# Patient Record
Sex: FEMALE | Race: BLACK OR AFRICAN AMERICAN | URBAN - METROPOLITAN AREA
[De-identification: names, ages, dates, MRNs, and addresses within clinical notes are randomized per-mention and may not be internally consistent; named-entity substitution may affect disease eponyms.]

---

## 2020-11-16 ENCOUNTER — HISTORICAL (OUTPATIENT)
Dept: ADMINISTRATIVE | Facility: HOSPITAL | Age: 20
End: 2020-11-16

## 2022-04-09 ENCOUNTER — HISTORICAL (OUTPATIENT)
Dept: ADMINISTRATIVE | Facility: HOSPITAL | Age: 22
End: 2022-04-09

## 2022-04-25 VITALS
DIASTOLIC BLOOD PRESSURE: 69 MMHG | BODY MASS INDEX: 19.63 KG/M2 | WEIGHT: 122.13 LBS | SYSTOLIC BLOOD PRESSURE: 106 MMHG | HEIGHT: 66 IN

## 2022-05-02 NOTE — HISTORICAL [facility]
This is a historical note converted from Claire. Formatting and pictures may have been removed.  Please reference Claire for original formatting and attached multimedia. Chief Complaint  Referred for right foot fx  History of Present Illness  19-year-old female who?tripped and fell 2 weeks ago?sustaining an injury to her right ankle. ?She was seen in outside facility and noted to have a possible ankle fracture?placed in a splint and told to be nonweightbearing on crutches. ?Since then she has had?improvement in pain but?again she has been in a splint and compliant with nonweightbearing.  She reports the pain is localized to the ankle.? No pain in the foot or the proximal tibia.  She reports she does smoke. ?No drinking.  History of borderline hypothyroidism.  Review of Systems  No shortness of breath or chest pain  Physical Exam  Vitals & Measurements  HR:?96(Peripheral)? BP:?102/65?  HT:?168.00?cm? WT:?52.600?kg? BMI:?18.64? LMP:?11/14/2020 00:00 CST?  General NAD  Cardiology regular rate and rhythm per radial pulse  Right lower extremity  No wounds or abrasions  Tender to palpation at the ATFL?and CFL as well as at the posterior aspect of the fibula around?the peroneal tendons  Pain with?passive inversion and?active eversion  No pain with?dorsiflexion or compression of the syndesmosis  EHL FHL gastrocsoleus?TA 5/5 motor  Sensation intact throughout foot  2+ DP pulse  ?  X-rays today of?right ankle/foot: No fractures noted along the?foot and?joint alignment?appears appropriate with no severe arthritis noted,?ankle x-rays do not appear to show?any acute fractures?and joint alignment appears to be appropriate  Assessment/Plan  19-year-old female?who sustained?a right ankle sprain versus?peroneal tendon?injury?2 weeks ago.  -Patient do not appear to have any bony?injuries at this time and is not having any subluxation of the peroneal tendons?and so this point I think we?should get patient moving. ?We will place her in  a cam boot?and let her weight-bear as tolerated. ?When she feels good with this she can come out of the cam boot and start weightbearing as tolerated in a regular shoe  -We will follow up with her in 4 weeks and if she is having any new issues at this time would consider further imaging but she is feeling great?she can?continue on a regular shoe?and follow-up as needed  -Follow-up in 4 weeks  ?  Radha Lee?was evaluated at the time of the encounter with?Dr Ponce.? HPI, PE and treatment plan was reviewed.? Treatment plan was reasonable and necessary.??Imaging was reviewed at the time of visit.??   Medications  azelastine 137 mcg/inh (0.1%) nasal spray, 1 spray(s), Nasal, BID,? ?Not taking: pt stated she is currently not taking this medication  Mobic 7.5 mg oral tablet, 7.5 mg= 1 tab(s), Oral, Daily, PRN  Tylenol with Codeine #3 oral tablet, 1 tab(s), Oral, q4hr, PRN,? ?Not taking: pt stated she is currently not taking this medication

## 2024-06-19 PROCEDURE — 87624 HPV HI-RISK TYP POOLED RSLT: CPT | Performed by: OBSTETRICS & GYNECOLOGY

## 2024-06-19 PROCEDURE — 87491 CHLMYD TRACH DNA AMP PROBE: CPT | Performed by: OBSTETRICS & GYNECOLOGY

## 2024-06-19 PROCEDURE — 87661 TRICHOMONAS VAGINALIS AMPLIF: CPT | Performed by: OBSTETRICS & GYNECOLOGY

## 2024-06-19 PROCEDURE — 87591 N.GONORRHOEAE DNA AMP PROB: CPT | Performed by: OBSTETRICS & GYNECOLOGY

## 2024-07-02 ENCOUNTER — TELEPHONE (OUTPATIENT)
Dept: OBSTETRICS AND GYNECOLOGY | Facility: HOSPITAL | Age: 24
End: 2024-07-02
Payer: COMMERCIAL

## 2024-07-02 DIAGNOSIS — D27.9 DERMOID CYST OF OVARY, UNSPECIFIED LATERALITY: Primary | ICD-10-CM

## 2024-07-02 NOTE — TELEPHONE ENCOUNTER
Has conversation about dermoid cyst and us and ct findings that if patient is still in pain I am recommending laporoscopic ovairan cystectomy possible oopherectomy possible open   Rba to suregery dicussed pt will come to office to sign consents.   Time 15min taken for Q&A

## 2024-07-12 ENCOUNTER — HOSPITAL ENCOUNTER (OUTPATIENT)
Dept: RADIOLOGY | Facility: HOSPITAL | Age: 24
Discharge: HOME | End: 2024-07-12
Attending: OBSTETRICS & GYNECOLOGY
Payer: MEDICAID

## 2024-07-12 DIAGNOSIS — Z79.01 LONG TERM (CURRENT) USE OF ANTICOAGULANTS: ICD-10-CM

## 2024-07-12 DIAGNOSIS — Z01.812 PRE-OPERATIVE LABORATORY EXAMINATION: ICD-10-CM

## 2024-07-12 DIAGNOSIS — Z01.812 PRE-OPERATIVE LABORATORY EXAMINATION: Primary | ICD-10-CM

## 2024-07-12 PROCEDURE — 71046 X-RAY EXAM CHEST 2 VIEWS: CPT | Mod: TC

## 2024-07-15 NOTE — DISCHARGE INSTRUCTIONS
Patient Education       Oophorectomy Discharge Instructions      What care is needed at home?   You can remove your dressings in 24 hours.  You may take a shower in 24 hours.  No heavy lifting over 10 pounds (4.5 kg)  Ask your doctor when you may go back to your normal activities like work, driving, or sex  Be sure to wash your hands before touching your wound or dressing.  Your normal bowel habits may come back slowly. Eat small meals high in fiber to avoid hard stools. Drink 6 to 8 glasses of fluids per day to help to prevent hard stools.  Use a small pillow to put pressure on your belly when you cough, laugh, sneeze, or change positions. Using a pillow can make you more comfortable.  What follow-up care is needed?   Your doctor may ask you to make visits to the office to check on your progress. Be sure to keep these visits.  You may need lubricants for sex if your hormones have changed. Talk to your doctor about your need for lubricants.  If you have your uterus and want to try to get pregnant, talk to your doctor about what options may work for you.  What drugs may be needed?   If both of your ovaries were taken out, your doctor may order hormone replacement. Talk to your doctor to decide if hormone replacement is best for you. When both ovaries are taken out, you will have signs of menopause.  Hot flashes  Trouble sleeping  Mood swings  Dryness in the vagina which may cause discomfort during sex  These are the same signs you would have if menopause had happened naturally. Ask your doctor how to lessen these signs if you decide not to take hormones.  Will physical activity be limited?   You may have to limit your activity. Talk to your doctor about the right amount of activity for you.   Try to walk each day. Start by walking a little more than you did the day before. Walking boosts blood flow and helps prevent lung, belly, and blood problems.  What problems could happen?   Not able to have children if both  ovaries were removed  Infection  Wound opening  Heavy bleeding  Blood clots in your legs or lungs  Injury to the intestines  When do I need to call the doctor?   Signs of infection. These include a fever of 100.4°F (38°C) or higher, chills, pain with passing urine or not able to pass urine, vaginal itching.  Signs of wound infection. These include swelling, redness, warmth around the wound; too much pain when touched; wound will not heal; yellowish, greenish, or bloody discharge; foul smell coming from the cut site; cut site opens up.  Lots of blood in your sanitary pads, or more than 6 soaked pads per day  Foul smelling green or yellow vaginal discharge  Upset stomach, throwing up, very bad belly pain, or not able to have a bowel movement for 3 days  Pain not helped by prescribed drugs  Swelling in your leg or arm that is much greater on one side than on the other  Mood changes

## 2024-07-18 ENCOUNTER — ANESTHESIA EVENT (OUTPATIENT)
Dept: SURGERY | Facility: HOSPITAL | Age: 24
End: 2024-07-18
Payer: MEDICAID

## 2024-07-18 RX ORDER — MUPIROCIN 20 MG/G
OINTMENT TOPICAL
Status: CANCELLED | OUTPATIENT
Start: 2024-07-18

## 2024-07-18 NOTE — H&P
Patient ID: Radha Lee is a 23 y.o. female.    Chief Complaint: pelvic pain dermoids      HPI:  Lisandra Lee is a 23 y.o. female  presents for gyn visit fu from [facility] urgent care she has had a ct scan that shows bilateral dermoid tumors w contrinued pain. She denies breast pain she has dyspareunia she states the pain is constant she denies nv early satiety and moves bowels well. She has desire for definitive tx     Review of Systems  Pt denies 11 points asked besides cc  No current facility-administered medications for this encounter.     No current outpatient medications on file.       Review of patient's allergies indicates:  No Known Allergies    Past Medical History:   Diagnosis Date    Pelvic pain syndrome        History reviewed. No pertinent surgical history.    Social History     Socioeconomic History    Marital status: Single   Tobacco Use    Smoking status: Former     Types: Cigarettes     Start date: 2024     Quit date:      Years since quitting: 10.5    Smokeless tobacco: Never   Substance and Sexual Activity    Alcohol use: Yes     Comment: occasionally    Drug use: Yes     Frequency: 1.0 times per week     Types: Marijuana       There were no vitals filed for this visit.    Physical Exam  APPEARANCE: Well nourished, well developed, in no acute distress.  AFFECT: WNL, alert and oriented x 3  ABDOMEN: Soft.  No tenderness or masses.  No hepatosplenomegaly.  No hernias.  BREASTS: Symmetrical, no skin changes or visible lesions.  No palpable masses, nipple discharge bilaterally.  PELVIC: Normal external genitalia without lesions.  Normal hair distribution.  Adequate perineal body, normal urethral meatus.  Vagina moist and well rugated without lesions or discharge.  Cervix pink, without lesions, discharge or tenderness.  No significant cystocele or rectocele.  Bimanual exam shows uterus to be normal size, regular, mobile and nontender.  Adnexa without masses or tenderness.     EXTREMITIES: No edema  Assessment & Plan:  Admit to [facility] for lap ovarian cystectomy possible oopherectomy  Rba to this sx discussed in great detail w patient and her family  Time 55-60min taken for Q&A consents signed  Admit to [facility]  Consent for sx and blood to chart  Scds oncall to or  Upt   Lr 125cc hour    No changes to hnp

## 2024-07-18 NOTE — ANESTHESIA PREPROCEDURE EVALUATION
07/18/2024  Radha Lee is a 23 y.o., female, who presents for the following:    Procedure: EXCISION, CYST, OVARY, LAPAROSCOPIC   Anesthesia type: General   Diagnosis:      Pelvic pain syndrome [R10.2]      Follicular cyst of ovary [N83.00]   Pre-op diagnosis:      Pelvic pain syndrome [R10.2]      Follicular cyst of ovary [N83.00]   Location: [facility] OR  / [facility] OR   Surgeons: Tony Pink MD     LAB: reviewed    UPT: neg    Pre-op Assessment    I have reviewed the Patient Summary Reports.     I have reviewed the Nursing Notes. I have reviewed the NPO Status.   I have reviewed the Medications.     Review of Systems  Anesthesia Hx:  No problems with previous Anesthesia             Denies Family Hx of Anesthesia complications.    Denies Personal Hx of Anesthesia complications.                    Social:  Former Smoker + Marijuana use                                                                                                                                                                                                                                                                                                                                                                                                                                                                                                                                                                                                                                                                                                                                                                                                                                                                                                                                                                                                                                           Cardiovascular:  Cardiovascular Normal                                            Pulmonary:  Pulmonary Normal                       Renal/:  Chronic Renal Disease                Endocrine:  Endocrine Normal                Physical Exam  General: Alert and Oriented    Airway:  Mallampati: II   Mouth Opening: Normal  TM Distance: Normal  Tongue: Normal  Neck ROM: Normal ROM    Dental:  Intact    Chest/Lungs:  Normal Respiratory Rate    Heart:  Rate: Normal  Rhythm: Regular Rhythm        Anesthesia Plan  Type of Anesthesia, risks & benefits discussed:    Anesthesia Type: Gen ETT  Intra-op Monitoring Plan: Standard ASA Monitors  Post Op Pain Control Plan: IV/PO Opioids PRN and multimodal analgesia  Induction:  IV  Airway Plan: Direct, Post-Induction  Informed Consent: Informed consent signed with the Patient and all parties understand the risks and agree with anesthesia plan.  All questions answered. Patient consented to blood products? Yes  ASA Score: 2  Day of Surgery Review of History & Physical: H&P Update referred to the surgeon/provider.  Anesthesia Plan Notes: ERAS / Multiple Antiemetics    Ready For Surgery From Anesthesia Perspective.     .

## 2024-07-19 ENCOUNTER — HOSPITAL ENCOUNTER (OUTPATIENT)
Facility: HOSPITAL | Age: 24
Discharge: HOME | End: 2024-07-19
Attending: OBSTETRICS & GYNECOLOGY | Admitting: OBSTETRICS & GYNECOLOGY
Payer: MEDICAID

## 2024-07-19 ENCOUNTER — ANESTHESIA (OUTPATIENT)
Dept: SURGERY | Facility: HOSPITAL | Age: 24
End: 2024-07-19
Payer: MEDICAID

## 2024-07-19 DIAGNOSIS — R10.2 PELVIC PAIN SYNDROME: ICD-10-CM

## 2024-07-19 DIAGNOSIS — N83.00 FOLLICULAR CYST OF OVARY: ICD-10-CM

## 2024-07-19 DIAGNOSIS — N83.209 OVARIAN CYST: ICD-10-CM

## 2024-07-19 LAB
B-HCG UR QL: NEGATIVE
CTP QC/QA: YES

## 2024-07-19 PROCEDURE — 25000003 PHARM REV CODE 250: Performed by: OBSTETRICS & GYNECOLOGY

## 2024-07-19 PROCEDURE — 71000015 HC POSTOP RECOV 1ST HR: Performed by: OBSTETRICS & GYNECOLOGY

## 2024-07-19 PROCEDURE — 37000009 HC ANESTHESIA EA ADD 15 MINS: Performed by: OBSTETRICS & GYNECOLOGY

## 2024-07-19 PROCEDURE — 36000708 HC OR TIME LEV III 1ST 15 MIN: Performed by: OBSTETRICS & GYNECOLOGY

## 2024-07-19 PROCEDURE — 86900 BLOOD TYPING SEROLOGIC ABO: CPT | Performed by: OBSTETRICS & GYNECOLOGY

## 2024-07-19 PROCEDURE — 36000709 HC OR TIME LEV III EA ADD 15 MIN: Performed by: OBSTETRICS & GYNECOLOGY

## 2024-07-19 PROCEDURE — 25000003 PHARM REV CODE 250: Performed by: NURSE ANESTHETIST, CERTIFIED REGISTERED

## 2024-07-19 PROCEDURE — 63600175 PHARM REV CODE 636 W HCPCS: Performed by: OBSTETRICS & GYNECOLOGY

## 2024-07-19 PROCEDURE — 63600175 PHARM REV CODE 636 W HCPCS: Performed by: ANESTHESIOLOGY

## 2024-07-19 PROCEDURE — 36415 COLL VENOUS BLD VENIPUNCTURE: CPT

## 2024-07-19 PROCEDURE — 63600175 PHARM REV CODE 636 W HCPCS: Performed by: NURSE ANESTHETIST, CERTIFIED REGISTERED

## 2024-07-19 PROCEDURE — 86850 RBC ANTIBODY SCREEN: CPT | Performed by: OBSTETRICS & GYNECOLOGY

## 2024-07-19 PROCEDURE — 63600175 PHARM REV CODE 636 W HCPCS: Mod: JZ,JG | Performed by: OBSTETRICS & GYNECOLOGY

## 2024-07-19 PROCEDURE — 27201423 OPTIME MED/SURG SUP & DEVICES STERILE SUPPLY: Performed by: OBSTETRICS & GYNECOLOGY

## 2024-07-19 PROCEDURE — 81025 URINE PREGNANCY TEST: CPT | Performed by: OBSTETRICS & GYNECOLOGY

## 2024-07-19 PROCEDURE — 71000039 HC RECOVERY, EACH ADD'L HOUR: Performed by: OBSTETRICS & GYNECOLOGY

## 2024-07-19 PROCEDURE — 25000003 PHARM REV CODE 250: Performed by: ANESTHESIOLOGY

## 2024-07-19 PROCEDURE — 71000016 HC POSTOP RECOV ADDL HR: Performed by: OBSTETRICS & GYNECOLOGY

## 2024-07-19 PROCEDURE — 37000008 HC ANESTHESIA 1ST 15 MINUTES: Performed by: OBSTETRICS & GYNECOLOGY

## 2024-07-19 PROCEDURE — 71000033 HC RECOVERY, INTIAL HOUR: Performed by: OBSTETRICS & GYNECOLOGY

## 2024-07-19 PROCEDURE — 36415 COLL VENOUS BLD VENIPUNCTURE: CPT | Mod: 91 | Performed by: OBSTETRICS & GYNECOLOGY

## 2024-07-19 RX ORDER — GLUCAGON 1 MG
1 KIT INJECTION
Status: DISCONTINUED | OUTPATIENT
Start: 2024-07-19 | End: 2024-07-19 | Stop reason: HOSPADM

## 2024-07-19 RX ORDER — BUPIVACAINE HYDROCHLORIDE 2.5 MG/ML
INJECTION, SOLUTION EPIDURAL; INFILTRATION; INTRACAUDAL
Status: DISCONTINUED
Start: 2024-07-19 | End: 2024-07-19 | Stop reason: HOSPADM

## 2024-07-19 RX ORDER — BUPIVACAINE HYDROCHLORIDE 2.5 MG/ML
INJECTION, SOLUTION EPIDURAL; INFILTRATION; INTRACAUDAL
Status: DISCONTINUED | OUTPATIENT
Start: 2024-07-19 | End: 2024-07-19 | Stop reason: HOSPADM

## 2024-07-19 RX ORDER — HYDROMORPHONE HYDROCHLORIDE 2 MG/ML
1 INJECTION, SOLUTION INTRAMUSCULAR; INTRAVENOUS; SUBCUTANEOUS EVERY 4 HOURS PRN
Status: DISCONTINUED | OUTPATIENT
Start: 2024-07-19 | End: 2024-07-19 | Stop reason: HOSPADM

## 2024-07-19 RX ORDER — CEFAZOLIN SODIUM 1 G/3ML
INJECTION, POWDER, FOR SOLUTION INTRAMUSCULAR; INTRAVENOUS
Status: DISCONTINUED | OUTPATIENT
Start: 2024-07-19 | End: 2024-07-19

## 2024-07-19 RX ORDER — FAMOTIDINE 20 MG/1
20 TABLET, FILM COATED ORAL
Status: COMPLETED | OUTPATIENT
Start: 2024-07-19 | End: 2024-07-19

## 2024-07-19 RX ORDER — MEPERIDINE HYDROCHLORIDE 25 MG/ML
12.5 INJECTION INTRAMUSCULAR; INTRAVENOUS; SUBCUTANEOUS ONCE AS NEEDED
Status: COMPLETED | OUTPATIENT
Start: 2024-07-19 | End: 2024-07-19

## 2024-07-19 RX ORDER — PROCHLORPERAZINE EDISYLATE 5 MG/ML
5 INJECTION INTRAMUSCULAR; INTRAVENOUS EVERY 30 MIN PRN
Status: DISCONTINUED | OUTPATIENT
Start: 2024-07-19 | End: 2024-07-19

## 2024-07-19 RX ORDER — ACETAMINOPHEN 500 MG
1000 TABLET ORAL
Status: COMPLETED | OUTPATIENT
Start: 2024-07-19 | End: 2024-07-19

## 2024-07-19 RX ORDER — DIPHENHYDRAMINE HCL 25 MG
25 CAPSULE ORAL EVERY 4 HOURS PRN
Status: DISCONTINUED | OUTPATIENT
Start: 2024-07-19 | End: 2024-07-19 | Stop reason: HOSPADM

## 2024-07-19 RX ORDER — GABAPENTIN 300 MG/1
300 CAPSULE ORAL
Status: COMPLETED | OUTPATIENT
Start: 2024-07-19 | End: 2024-07-19

## 2024-07-19 RX ORDER — ONDANSETRON HYDROCHLORIDE 2 MG/ML
INJECTION, SOLUTION INTRAMUSCULAR; INTRAVENOUS
Status: DISCONTINUED | OUTPATIENT
Start: 2024-07-19 | End: 2024-07-19

## 2024-07-19 RX ORDER — DIPHENHYDRAMINE HYDROCHLORIDE 50 MG/ML
25 INJECTION INTRAMUSCULAR; INTRAVENOUS EVERY 4 HOURS PRN
Status: DISCONTINUED | OUTPATIENT
Start: 2024-07-19 | End: 2024-07-19 | Stop reason: HOSPADM

## 2024-07-19 RX ORDER — HYDROCODONE BITARTRATE AND ACETAMINOPHEN 5; 325 MG/1; MG/1
1 TABLET ORAL EVERY 4 HOURS PRN
Status: DISCONTINUED | OUTPATIENT
Start: 2024-07-19 | End: 2024-07-19 | Stop reason: HOSPADM

## 2024-07-19 RX ORDER — LIDOCAINE HYDROCHLORIDE 10 MG/ML
INJECTION, SOLUTION EPIDURAL; INFILTRATION; INTRACAUDAL; PERINEURAL
Status: DISCONTINUED | OUTPATIENT
Start: 2024-07-19 | End: 2024-07-19

## 2024-07-19 RX ORDER — KETOROLAC TROMETHAMINE 30 MG/ML
INJECTION, SOLUTION INTRAMUSCULAR; INTRAVENOUS
Status: DISCONTINUED | OUTPATIENT
Start: 2024-07-19 | End: 2024-07-19

## 2024-07-19 RX ORDER — CELECOXIB 200 MG/1
200 CAPSULE ORAL
Status: COMPLETED | OUTPATIENT
Start: 2024-07-19 | End: 2024-07-19

## 2024-07-19 RX ORDER — ONDANSETRON 4 MG/1
8 TABLET, ORALLY DISINTEGRATING ORAL EVERY 8 HOURS PRN
Status: DISCONTINUED | OUTPATIENT
Start: 2024-07-19 | End: 2024-07-19 | Stop reason: HOSPADM

## 2024-07-19 RX ORDER — SODIUM CHLORIDE, SODIUM LACTATE, POTASSIUM CHLORIDE, CALCIUM CHLORIDE 600; 310; 30; 20 MG/100ML; MG/100ML; MG/100ML; MG/100ML
INJECTION, SOLUTION INTRAVENOUS CONTINUOUS
Status: DISCONTINUED | OUTPATIENT
Start: 2024-07-19 | End: 2024-07-19

## 2024-07-19 RX ORDER — PROPOFOL 10 MG/ML
VIAL (ML) INTRAVENOUS
Status: DISCONTINUED | OUTPATIENT
Start: 2024-07-19 | End: 2024-07-19

## 2024-07-19 RX ORDER — ROCURONIUM BROMIDE 10 MG/ML
INJECTION, SOLUTION INTRAVENOUS
Status: DISCONTINUED | OUTPATIENT
Start: 2024-07-19 | End: 2024-07-19

## 2024-07-19 RX ORDER — HYDROMORPHONE HYDROCHLORIDE 2 MG/ML
0.4 INJECTION, SOLUTION INTRAMUSCULAR; INTRAVENOUS; SUBCUTANEOUS EVERY 5 MIN PRN
Status: DISCONTINUED | OUTPATIENT
Start: 2024-07-19 | End: 2024-07-19

## 2024-07-19 RX ORDER — ONDANSETRON HYDROCHLORIDE 2 MG/ML
4 INJECTION, SOLUTION INTRAVENOUS
Status: COMPLETED | OUTPATIENT
Start: 2024-07-19 | End: 2024-07-19

## 2024-07-19 RX ORDER — SILVER NITRATE 38.21; 12.74 MG/1; MG/1
STICK TOPICAL
Status: DISCONTINUED
Start: 2024-07-19 | End: 2024-07-19 | Stop reason: HOSPADM

## 2024-07-19 RX ORDER — SILVER NITRATE 38.21; 12.74 MG/1; MG/1
STICK TOPICAL
Status: DISCONTINUED | OUTPATIENT
Start: 2024-07-19 | End: 2024-07-19 | Stop reason: HOSPADM

## 2024-07-19 RX ORDER — LIDOCAINE HYDROCHLORIDE 10 MG/ML
1 INJECTION, SOLUTION EPIDURAL; INFILTRATION; INTRACAUDAL; PERINEURAL ONCE
Status: DISCONTINUED | OUTPATIENT
Start: 2024-07-19 | End: 2024-07-19

## 2024-07-19 RX ORDER — DEXAMETHASONE SODIUM PHOSPHATE 4 MG/ML
INJECTION, SOLUTION INTRA-ARTICULAR; INTRALESIONAL; INTRAMUSCULAR; INTRAVENOUS; SOFT TISSUE
Status: DISCONTINUED | OUTPATIENT
Start: 2024-07-19 | End: 2024-07-19

## 2024-07-19 RX ORDER — SODIUM CHLORIDE 9 MG/ML
INJECTION, SOLUTION INTRAVENOUS CONTINUOUS
Status: DISCONTINUED | OUTPATIENT
Start: 2024-07-19 | End: 2024-07-19

## 2024-07-19 RX ORDER — HYDROMORPHONE HYDROCHLORIDE 2 MG/ML
INJECTION, SOLUTION INTRAMUSCULAR; INTRAVENOUS; SUBCUTANEOUS
Status: DISCONTINUED | OUTPATIENT
Start: 2024-07-19 | End: 2024-07-19

## 2024-07-19 RX ORDER — IBUPROFEN 600 MG/1
600 TABLET ORAL EVERY 6 HOURS PRN
Status: DISCONTINUED | OUTPATIENT
Start: 2024-07-19 | End: 2024-07-19 | Stop reason: HOSPADM

## 2024-07-19 RX ORDER — FENTANYL CITRATE 50 UG/ML
INJECTION, SOLUTION INTRAMUSCULAR; INTRAVENOUS
Status: DISCONTINUED | OUTPATIENT
Start: 2024-07-19 | End: 2024-07-19

## 2024-07-19 RX ORDER — ONDANSETRON HYDROCHLORIDE 2 MG/ML
4 INJECTION, SOLUTION INTRAVENOUS ONCE AS NEEDED
Status: DISCONTINUED | OUTPATIENT
Start: 2024-07-19 | End: 2024-07-19

## 2024-07-19 RX ORDER — MIDAZOLAM HYDROCHLORIDE 2 MG/2ML
2 INJECTION, SOLUTION INTRAMUSCULAR; INTRAVENOUS ONCE AS NEEDED
Status: COMPLETED | OUTPATIENT
Start: 2024-07-19 | End: 2024-07-19

## 2024-07-19 RX ADMIN — HYDROMORPHONE HYDROCHLORIDE 0.4 MG: 2 INJECTION INTRAMUSCULAR; INTRAVENOUS; SUBCUTANEOUS at 08:07

## 2024-07-19 RX ADMIN — PROPOFOL 130 MG: 10 INJECTION, EMULSION INTRAVENOUS at 07:07

## 2024-07-19 RX ADMIN — FENTANYL CITRATE 25 MCG: 50 INJECTION, SOLUTION INTRAMUSCULAR; INTRAVENOUS at 07:07

## 2024-07-19 RX ADMIN — SODIUM CHLORIDE, POTASSIUM CHLORIDE, SODIUM LACTATE AND CALCIUM CHLORIDE: 600; 310; 30; 20 INJECTION, SOLUTION INTRAVENOUS at 08:07

## 2024-07-19 RX ADMIN — SUGAMMADEX 150 MG: 100 INJECTION, SOLUTION INTRAVENOUS at 08:07

## 2024-07-19 RX ADMIN — DEXAMETHASONE SODIUM PHOSPHATE 4 MG: 4 INJECTION, SOLUTION INTRA-ARTICULAR; INTRALESIONAL; INTRAMUSCULAR; INTRAVENOUS; SOFT TISSUE at 07:07

## 2024-07-19 RX ADMIN — LIDOCAINE HYDROCHLORIDE 50 MG: 10 INJECTION, SOLUTION EPIDURAL; INFILTRATION; INTRACAUDAL; PERINEURAL at 07:07

## 2024-07-19 RX ADMIN — FENTANYL CITRATE 50 MCG: 50 INJECTION, SOLUTION INTRAMUSCULAR; INTRAVENOUS at 07:07

## 2024-07-19 RX ADMIN — HYDROMORPHONE HYDROCHLORIDE 0.4 MG: 2 INJECTION, SOLUTION INTRAMUSCULAR; INTRAVENOUS; SUBCUTANEOUS at 08:07

## 2024-07-19 RX ADMIN — MEPERIDINE HYDROCHLORIDE 12.5 MG: 25 INJECTION INTRAMUSCULAR; INTRAVENOUS; SUBCUTANEOUS at 09:07

## 2024-07-19 RX ADMIN — KETOROLAC TROMETHAMINE 30 MG: 30 INJECTION, SOLUTION INTRAMUSCULAR at 08:07

## 2024-07-19 RX ADMIN — CELECOXIB 200 MG: 200 CAPSULE ORAL at 05:07

## 2024-07-19 RX ADMIN — CEFAZOLIN 1 G: 330 INJECTION, POWDER, FOR SOLUTION INTRAMUSCULAR; INTRAVENOUS at 07:07

## 2024-07-19 RX ADMIN — ONDANSETRON 4 MG: 2 INJECTION INTRAMUSCULAR; INTRAVENOUS at 07:07

## 2024-07-19 RX ADMIN — ROCURONIUM BROMIDE 40 MG: 10 INJECTION, SOLUTION INTRAVENOUS at 07:07

## 2024-07-19 RX ADMIN — ONDANSETRON 4 MG: 2 INJECTION INTRAMUSCULAR; INTRAVENOUS at 05:07

## 2024-07-19 RX ADMIN — HYDROMORPHONE HYDROCHLORIDE 0.4 MG: 2 INJECTION INTRAMUSCULAR; INTRAVENOUS; SUBCUTANEOUS at 09:07

## 2024-07-19 RX ADMIN — SODIUM CHLORIDE, POTASSIUM CHLORIDE, SODIUM LACTATE AND CALCIUM CHLORIDE: 600; 310; 30; 20 INJECTION, SOLUTION INTRAVENOUS at 05:07

## 2024-07-19 RX ADMIN — GABAPENTIN 300 MG: 300 CAPSULE ORAL at 05:07

## 2024-07-19 RX ADMIN — ACETAMINOPHEN 1000 MG: 500 TABLET ORAL at 05:07

## 2024-07-19 RX ADMIN — MIDAZOLAM HYDROCHLORIDE 2 MG: 1 INJECTION, SOLUTION INTRAMUSCULAR; INTRAVENOUS at 06:07

## 2024-07-19 RX ADMIN — FAMOTIDINE 20 MG: 20 TABLET, FILM COATED ORAL at 05:07

## 2024-07-19 NOTE — OP NOTE
[facility]                       [address] [address]    PATIENT NAME:      SUNI GOMEZ   YOB: 2000  CSN:               [id number]  MRN:               [id number]  ADMIT DATE:        07/19/2024 05:21:00  PHYSICIAN:         Tony Pink MD                          OPERATIVE REPORT      DATE OF SURGERY:    07/19/2024 00:00:00    SURGEON:  Tnoy Pink MD    PREOPERATIVE DIAGNOSIS:  A 23-year-old, pelvic pain, suspected right dermoid   cyst, possible left dermoid cyst or ovarian cyst.    POSTOPERATIVE DIAGNOSIS:  A 23-year-old, pelvic pain, suspected right dermoid   cyst, possible left dermoid cyst or ovarian cyst.    FIRST ASSISTANT:  Frandy Gomez MD whose help was absolutely needed with   this large dermoid tumor.    ANESTHESIOLOGIST:  anum    ANESTHESIA:  General.    BLOOD LOSS:  Minimal.    SPECIMEN:  Right ovary.    FINDINGS:  Normal uterus, smooth 7 weeks.  Left adnexa, simple appearing cyst   corpus luteal in nature.  Right ovary completely engulfed in dermoid tissue, egg   yolk material and hair.  No normal ovarian tissue identified.  Normal fallopian   tube, smooth peritoneal surfaces.  Freely mobile contents.  Normal-appearing   liver and gallbladder.    ANTIBIOTICS:  Ancef secondary to spillage of ovarian cyst contents.    SCDs were placed.  Pregnancy test is negative day of surgery.  The bladder was   straight cathed for 100 cc clear urine.    FLUIDS:  Crystalloid.    PROCEDURE IN DETAIL:  The risks, benefits, and alternatives to this procedure   were explained in detail to Ms. Hackett.  She verbalized understanding and   consents were signed.  She was taken to the operating theater with intravenous   fluids running and placed on the operating room table in the dorsal supine   position with SCDs on.  General anesthesia was achieved without difficulty.  She   was placed in  Waylon maldonado and prepped and draped in usual sterile fashion.    Bimanual exam performed.  Graves speculum placed.  Cervix was visualized and   grasped with a single-tooth tenaculum.  An acorn uterine manipulator placed   without difficulty.  Speculum removed.  The patient was placed in low lithotomy   position.  Changed my gloves, turned my attention towards the patient's abdomen.    At this time towel clips were placed at 3 and 9 o'clock.  A 10 mm incision was   made at the 6 o'clock position of the umbilicus.  Of note, there was keloid   tissue there from previous bellybutton rings.  A Veress needle was inserted at   45 degrees.  Its placement was confirmed by drop in the saline column.    Pneumoperitoneum was obtained with 2 L of distention gas.  At this time using a   0 degree Optiview port it was placed into the abdominal cavity under direct   laparoscopic visualization.  Sleeve ports were placed in the left and right   lower quadrant under direct laparoscopic visualization 5 mm.  A survey of the   pelvis revealed the findings previously discussed.  At this time because of the   patient's H and early parity status the attempt to perform an ovarian cystectomy   was made.  As we dissected the dermoid cyst out of what appeared to be normal   ovarian tissue, it became apparent.  There was no normal ovarian tissue and I   made the exact decision to perform a salpingo-oophorectomy.  Secondary to the   ovary being engulfed in hair yellow sebaceous material and not normal-appearing   ovarian tissue.  At this time, the Harmonic Scalpel was used after dissecting   the cyst with the Harmonic for some time with some spillage of fluid, which was   immediately sucked out and copiously irrigated.  I made the decision to take the   ovary.  The right infundibulopelvic ligament was isolated, noted to be well out   of the way of the right ureter.  Using the Harmonic Scalpel, the   infundibulopelvic ligament was fulgurated and  transected.  I removed the right   lower quadrant 5 port and a 10 port was placed.  A laparoscopic Endo pouch was   placed and the ovary and cyst contents were placed into the Endopouch and   removed.  The 10 port was replaced.  Hemostasis was noted at the surgical site.    The pelvis was irrigated with copious amounts of irrigation.  I reversed the   Trendelenburg and then put her in Trendelenburg and irrigated once more.    Hemostasis was noted.  I then inspected the left ovary and it appeared to be   normal tissue.  A normal corpus luteal appearing cyst simple in nature.  Before   release of pneumoperitoneum I removed the 10 port and closed the fascia with a 0   Vicryl suture with an URL 6 needle under direct laparoscopic visualization.    The camera port and slave port were removed after removing the pneumoperitoneum.    All 3 port sites injected with local anesthetic.  The skin was reapproximated   with 4-0 Vicryl suture in subcuticular fashion followed by Dermabond glue.  I   turned my attention towards the vagina, speculum replaced, acorn removed,   tenaculum removed, tenaculum site fulgurated with silver nitrate stick.  No   active bleeding noted.  Vagina cleared of all clots and debris.  Procedure   deemed over.  Sponge, lap, instrument, needle counts were correct x2.  The   patient was cleaned, woken taken to the recovery room in awake and in stable   condition.  I made an attempt to speak with her significant other.  He is   snoring in the postoperative room in deep sleep.        ______________________________  MD BERT Cisneros/VALENTINA  DD:  07/19/2024  Time:  08:34AM  DT:  07/19/2024  Time:  04:28PM  Job #:  [id number]      OPERATIVE REPORT

## 2024-07-19 NOTE — ANESTHESIA PROCEDURE NOTES
Intubation    Date/Time: 7/19/2024 7:07 AM    Performed by: Molly Rincon CRNA  Authorized by: Mega Leigh MD    Intubation:     Induction:  Intravenous    Intubated:  Postinduction    Mask Ventilation:  Easy mask    Attempts:  1    Attempted By:  CRNA    Blade:  Scott 2    Laryngeal View Grade: Grade I - full view of cords      Difficult Airway Encountered?: No      Complications:  None    Airway Device:  Oral endotracheal tube    Airway Device Size:  6.5    Style/Cuff Inflation:  Cuffed (inflated to minimal occlusive pressure)    Tube secured:  22    Secured at:  The lips    Placement Verified By:  Capnometry    Complicating Factors:  None    Findings Post-Intubation:  BS equal bilateral and atraumatic/condition of teeth unchanged

## 2024-07-19 NOTE — DISCHARGE SUMMARY
[facility] Surgical - Periop Services  Obstetrics & Gynecology  Discharge Summary    Patient Name: Radha Lee  MRN: [id number]  Admission Date: 7/19/2024  Hospital Length of Stay: 0 days  Discharge Date and Time:  07/19/2024 8:37 AM  Attending Physician: Tony Pink MD   Discharging Provider: Tony Pink MD  Primary Care Provider: Marilyn Gaytan MD    HPI: ovarian cyst and pain     Hospital Course: stable    Procedure(s) (LRB):  EXCISION, CYST, OVARY, LAPAROSCOPIC (N/A)     Consults:     Significant Diagnostic Studies: Labs: All labs within the past 24 hours have been reviewed    Pending Diagnostic Studies:       Procedure Component Value Units Date/Time    Specimen to Pathology [[id number]] Collected: 07/19/24 0755    Order Status: Sent Lab Status: No result     Specimen: Tissue from Ovarian Cyst, Right           Final Active Diagnoses:    Diagnosis Date Noted POA    PRINCIPAL PROBLEM:  Ovarian cyst [N83.209] 07/19/2024 Unknown      Problems Resolved During this Admission:        Discharged Condition: stable    Disposition:     Follow Up:in 10-12 days pelvic rest    Patient Instructions:   No discharge procedures on file.  Medications:  Reconciled Home Medications:      Medication List      You have not been prescribed any medications.         Tony Pink MD  Obstetrics & Gynecology  [facility] - [facility]

## 2024-07-19 NOTE — CARE UPDATE
Patient awakened,mclain,rejected oral airway, oriented/reassured her, she denied c/o, exchanging well.

## 2024-07-19 NOTE — ANESTHESIA POSTPROCEDURE EVALUATION
Anesthesia Post Evaluation    Patient: Radha Lee    Procedure(s) Performed: Procedure(s) (LRB):  EXCISION, CYST, OVARY, LAPAROSCOPIC (N/A)    Final Anesthesia Type: general      Patient location during evaluation: PACU  Patient participation: No - Unable to Participate, Sedation  Level of consciousness: lethargic  Post-procedure vital signs: reviewed and stable  Pain management: adequate  Airway patency: patent    PONV status at discharge: No PONV  Anesthetic complications: no      Cardiovascular status: stable  Respiratory status: unassisted and nasal cannula  Hydration status: euvolemic  Follow-up not needed.              Vitals Value Taken Time   /71 07/19/24 0542   Temp 36.6 °C (97.9 °F) 07/19/24 0542   Pulse 77 07/19/24 0542   Resp 18 07/19/24 0542   SpO2 100 % 07/19/24 0542         No case tracking events are documented in the log.      Pain/Angelina Score: Pain Rating Prior to Med Admin: 0 (7/19/2024  5:39 AM)

## 2024-07-22 LAB — PSYCHE PATHOLOGY RESULT: NORMAL

## 2024-07-23 VITALS
SYSTOLIC BLOOD PRESSURE: 107 MMHG | DIASTOLIC BLOOD PRESSURE: 70 MMHG | HEART RATE: 76 BPM | BODY MASS INDEX: 21.33 KG/M2 | RESPIRATION RATE: 18 BRPM | OXYGEN SATURATION: 95 % | HEIGHT: 63 IN | TEMPERATURE: 98 F | WEIGHT: 120.38 LBS

## 2025-02-07 ENCOUNTER — LAB VISIT (OUTPATIENT)
Dept: LAB | Facility: HOSPITAL | Age: 25
End: 2025-02-07
Attending: OBSTETRICS & GYNECOLOGY
Payer: MEDICAID

## 2025-02-07 DIAGNOSIS — N96 HABITUAL ABORTER NOT CURRENTLY PREGNANT: ICD-10-CM

## 2025-02-07 LAB
T4 FREE SERPL-MCNC: 0.85 NG/DL (ref 0.7–1.48)
TSH SERPL-ACNC: 0.37 UIU/ML (ref 0.35–4.94)

## 2025-02-07 PROCEDURE — 81240 F2 GENE: CPT

## 2025-02-07 PROCEDURE — 85300 ANTITHROMBIN III ACTIVITY: CPT

## 2025-02-07 PROCEDURE — 36415 COLL VENOUS BLD VENIPUNCTURE: CPT

## 2025-02-07 PROCEDURE — 85303 CLOT INHIBIT PROT C ACTIVITY: CPT

## 2025-02-07 PROCEDURE — 85306 CLOT INHIBIT PROT S FREE: CPT

## 2025-02-07 PROCEDURE — 81241 F5 GENE: CPT

## 2025-02-07 PROCEDURE — 84439 ASSAY OF FREE THYROXINE: CPT

## 2025-02-07 PROCEDURE — 84443 ASSAY THYROID STIM HORMONE: CPT

## 2025-02-08 LAB
AT III ACT/NOR PPP CHRO: 98 % (ref 80–130)
PROT S FREE AG ACT/NOR PPP IA: 94 % (ref 50–160)

## 2025-02-10 LAB — PROT C ACT/NOR PPP CHRO: 104 % (ref 70–150)

## 2025-02-11 LAB
COAGULATION SPECIALIST REVIEW: NORMAL
F2 C.20210G>A GENO BLD/T: NEGATIVE
PROVIDER SIGNING NAME: NORMAL

## 2025-02-12 LAB
COAGULATION SPECIALIST REVIEW: NORMAL
F5 P.R506Q BLD/T QL: NEGATIVE
PROVIDER SIGNING NAME: NORMAL

## 2025-02-27 ENCOUNTER — TELEPHONE (OUTPATIENT)
Dept: MATERNAL FETAL MEDICINE | Facility: CLINIC | Age: 25
End: 2025-02-27
Payer: MEDICAID

## 2025-02-28 ENCOUNTER — TELEPHONE (OUTPATIENT)
Dept: MATERNAL FETAL MEDICINE | Facility: CLINIC | Age: 25
End: 2025-02-28
Payer: MEDICAID

## 2025-02-28 NOTE — PROGRESS NOTES
Maternal Fetal Medicine New Consult    Subjective:     Patient ID: [id number]    Chief Complaint: MFM Preconceptal consult (Habitual Aborter.  )      HPI: 24 y.o.  female  here today for preconceptual visit.     Ms. Lee is here today for preconceptual visit. She has had two miscarriages, most recently on 2024. She had some labs with Dr. Pink on 25 that were as follows: TSH 0.373, FT4 0.85,  Protein C activity normal, protein S normal, prothrombin gene mutation negative, FVL negative, antithrombin activity normal. She is a marijuana user, reports about once per week.        She denies any personal or family history of aneuploidy or anomalies. She denies any exposure to high fevers, viral rashes, illicit drugs or alcohol in this pregnancy.  She denies any leaking fluid, vaginal bleeding, contractions, decreased fetal movement. Denies headaches, visual disturbances, or epigastric pain.       Pregnancy complications include:  Problem List[1]    Past Medical History:   Diagnosis Date    Anxiety disorder, unspecified 2018    Miscarriage     10weeks 24    Pelvic pain syndrome        Past Surgical History:   Procedure Laterality Date    LAPAROSCOPIC SURGICAL REMOVAL OF CYST OF OVARY N/A 2024    Procedure: EXCISION, CYST, OVARY, LAPAROSCOPIC;  Surgeon: Tony Pink MD;  Location: [facility];  Service: OB/GYN;  Laterality: N/A;       Family History   Problem Relation Name Age of Onset    No Known Problems Paternal Grandfather      No Known Problems Paternal Grandmother      No Known Problems Maternal Grandmother      No Known Problems Maternal Grandfather      No Known Problems Father      No Known Problems Mother         Social History     Socioeconomic History    Marital status: Single   Tobacco Use    Smoking status: Former     Types: Cigarettes     Start date: 2024     Quit date:      Years since quittin.1     Passive exposure: Never    Smokeless tobacco: Never   Substance and  "Sexual Activity    Alcohol use: Not Currently     Comment: occasionally    Drug use: Yes     Frequency: 1.0 times per week     Types: Marijuana     Comment: last maromairajuana use 7/18/24    Sexual activity: Yes     Partners: Male       Current Medications[2]    Review of patient's allergies indicates:   Allergen Reactions    Citrus and derivatives         Review of Systems   12 point review of systems conducted, negative except as stated in the history of present illness. See HPI for details.      Objective:     Visit Vitals  /65 (BP Location: Left arm, Patient Position: Sitting)   Pulse 82   Ht 5' 3.78" (1.62 m)   Wt 55.8 kg (123 lb)   LMP 02/08/2025   BMI 21.26 kg/m²        Physical Exam  Vitals and nursing note reviewed.   Constitutional:       General: She is not in acute distress.     Appearance: Normal appearance.   HENT:      Head: Normocephalic and atraumatic.      Nose: Nose normal. No congestion.      Mouth/Throat:      Pharynx: Oropharynx is clear.   Eyes:      General: No scleral icterus.     Conjunctiva/sclera: Conjunctivae normal.   Cardiovascular:      Rate and Rhythm: Normal rate and regular rhythm.   Pulmonary:      Effort: No respiratory distress.      Breath sounds: Normal breath sounds. No wheezing.   Abdominal:      General: Abdomen is flat.      Palpations: Abdomen is soft.      Tenderness: There is no abdominal tenderness. There is no right CVA tenderness, left CVA tenderness or guarding.      Comments: No CVA tenderness gravid uterus.    Musculoskeletal:         General: Normal range of motion.      Cervical back: Neck supple.      Right lower leg: No edema.      Left lower leg: No edema.   Skin:     General: Skin is warm.      Findings: No bruising or rash.   Neurological:      General: No focal deficit present.      Mental Status: She is oriented to person, place, and time.      Deep Tendon Reflexes: Reflexes normal.      Comments: Normal reflexes   Psychiatric:         Mood and Affect: " Mood normal.         Behavior: Behavior normal.         Thought Content: Thought content normal.         Judgment: Judgment normal.         Assessment/Plan:     24 y.o.  female with IUP at Unknown    Miscarriage x2  Miscarriage is the most common complications of early pregnancy. An estimated 8-20% of clinically recognized pregnancies less than 20 weeks of gestation will miscarry. 80% of miscarriages happen in the first 12 weeks of gestation. Chromosomal abnormalities account for approximately 50% of all miscarriages, and the earlier the gestational age at miscarriage the higher the incidence. Other etiologies for miscarriage include congenital anomalies, maternal uterine anomalies, poorly controlled thyroid disease or diabetes, and acute maternal infection. Some miscarriages are unexplained. Recurrent pregnancy loss (RPL) refers to three or more consecutive losses of clinically recognized pregnancies prior to 20 weeks of gestation. While approximately 15% of women experience 1 miscarriage, only 2% experience 2 consecutive losses and less than 1% experience 3 consecutive losses.     In women with a history of two or more prior losses, the chance of a viable birth after ultrasound detection of fetal cardiac activity is approximately 77%. After evaluation, recurrent pregnancy loss remains unexplained in approximately one-half of couples. Nevertheless, the chance of a live birth after three unexplained RPLs is over 50 percent with no intervention.     Discussed with her that Antiphospholipid syndrome (APS) can be a potential cause of RPL. APS is an autoimmune disorder characterized by the presence of significant levels of antiphospholipid antibodies and one or more clinical features, among which are recurrent pregnancy loss, fetal death >10 weeks, and thrombosis. APS may occur as a primary condition in women with no other recognizable autoimmune disease, or as a secondary condition in patients with underlying  autoimmune disease (eg, systemic lupus erythematosus). 5-15% of women with recurrent miscarriages have significant antiphospholipid antibody titers compared with 2-5% of unselected obstetric patients.     Preferably, this testing should be done prior to pregnancy. After discussion of risks and benefits, it was agreed to have her do lupus anticoagulant, anticardiolipin antibody and anti-B2-glycoprotein I antibodies. If confirmed positive as mentioned above, she would need treatment with baby aspirin and heparin in her future pregnancy. Discussed the nature of APS and need for confirmation of positive testing 12 weeks after an initial positive, as transient positive results may occur after infection.     Some investigators have found that a small percentage of women with recurrent pregnancy loss who test negative for anticardiolipin antibodies have antibodies for other phospholipids, such as phosphatidylserine or phosphatidylethanolamine. Others have found that no such relationship exists or that testing for antibodies other than lupus anticoagulant and anticardiolipin antibodies does not increase the rate of diagnosis of APS. In addition, assays for phospholipid-binding antibodies other than anticardiolipin are not standardized, and there is no proven treatment for women with recurrent pregnancy loss and phospholipid-binding antibodies other than lupus anticoagulant and anticardiolipin antibodies.    An immunomodulatory therapy used as a treatment for recurrent pregnancy loss is intravenous immunoglobulin (IVIG). Initial interest in this therapy derives from the observation that IVIG contains antibodies that block antibody-mediated immune damage. Other known immunomodulating effects of IVIG include T cell receptor blockade, inhibition of NK-cell activity, inhibition of Th-1 cytokine secretion, Fc receptor blockade, complement inactivation, down-regulation of B cell responsiveness, and enhanced T cell suppressor cell  function. However, only one of five randomized trials using IVIG treatment in women with recurrent pregnancy loss demonstrated a benefit; results of the other four trials were negative. In addition, the results of two meta-analyses were also negative. IVIG is not effective in preventing recurrent pregnancy loss. Women with recurrent pregnancy loss should be tested for lupus anticoagulant, anticardiolipin antibodies and anti B2 microglobulin I, using standard assays.    Cultures for bacteria or viruses and tests for thrombophilia, glucose intolerance, thyroid abnormalities, antibodies to infectious agents, antinuclear antibodies, antithyroid antibodies, paternal human leukocyte antigen status, or maternal antipaternal antibodies are not beneficial and, therefore, are not recommended in the evaluation of otherwise normal women with recurrent pregnancy loss.    The possibility of balanced translocation in one of or both parents that might be a potential etiology for repetitive miscarriages was discussed. One partner (more often the woman) may have a balanced chromosomal rearrangement in up 3-6% of couples with repetitive miscarriages. Parental chromosome analysis is expensive, and may not be covered by insurance. With the low yield on that testing and because available treatment (IVF with preimplantation genetic diagnosis) has not been shown to improve outcome, many couples elect not to do testing. Up to 70% of couples with a balanced translocation will have a normal livebirth with no intervention. The risk of a translocation trisomy in  is less than 1%. Patient decided against this testing.      The possibility of uterine anomaly as a cause of repetitive miscarriage was addressed. Uterine malformations (mostly arcuate or septate uterus) occur in 10-25% of women with recurrent miscarriages compared with 5% of controls. The potential  benefit of doing a hysterosalpingogram, or sonohysterography to check on that  was discussed     If uterine septum is present, vascular insufficiency is thought to be the cause of miscarriage. Hysteroscopic septum resection would be necessary before attempt at future conception. If a bicornuate or unicornuate uterus is detected, then corrective surgery is not advised because obstetric outcomes are usually good with these conditions and surgical repair is more invasive and with potential risks.    Additionally, the possibility of leiomyoma being a contributing factor for repetitive miscarriages was addressed. This could be especially relevant risk factor for miscarriages, if she has some submucosal myomas that affect the implantation site of the pregnancy and hysterosalpingogram will be checking for that as well.    Although corpus luteal deficiency has been proposed as a cause of miscarriages, there is no clear threshold for normal progesterone, and neither such testing nor the timed endometrial histologic analysis to diagnose this potential disorder are recommended.    Immunologic testing such as HLA typing tests for immune reactivity of the mother against the father is also not recommended outside research testing as unequivocal cause of alloimmune cause of recurrent miscarriages is lacking and there is no good evidence of screening and treatment would improve outcomes.    With idiopathic recurrent miscarriages, no treatment with baby aspirin, heparin or both is recommended. Treatment did not improve the 65% live birth rate with placebo and has potential of bleeding complications. However, starting baby ASA immediately after a miscarriage, may improve successful pregnancy rate.    She was advised that recurrence of pregnancy loss is likely, even in the absence of any findings on evaluation. Consequently, she should make sure she is ready emotionally, and with good support system, before she tries to get pregnant again, in view of the emotional stress involved with any pregnancy loss,  especially if recurrent.      Marijuana use  I discussed with her the possibility of cognitive developmental effect of those drugs on a fetus is used anytime during pregnancy.  Marijuana use in pregnancy is associated with long term neurobehavioral adverse outcomes, as well as fetal growth restriction, stillbirth, spontaneous  birth, and  intensive care unit admission.  I stressed the importance of complete abstinence from drug use at this time and especially during any future pregnancy.         Potential causes of repetitive miscarriages discussed as above.  Advised the patient that usually workup is done after 3 miscarriages.  However in concern situations some minimal evaluation could be done after 2 miscarriages.  With the to miscarriages being in 1st trimester before a fetus was seen and by different partners, it is unlikely that it is a balance translocation issue and agreed to avoid any chromosomal analysis.  Agreed to do a limited testing with antiphospholipid antibody testing.  Progesterone supplementation could be considered in a future pregnancy.  Hysterosalpingogram could be considered in the future if recurrent miscarriages.  The yield on such testing at this time is very low and agreed to avoid it at this time.    Follow up for None. Keep follow up with primary OB.     Future Appointments   Date Time Provider Department Center   2025 10:00 AM Tony Pink MD [facility]        Patient was evaluated and examined by Dr. Hameed. APPs (MAYELA Marcial and/or EDDIE Dawkins) assisted in completion of part of note.    This note was created with the assistance of [employer] voice recognition software. There may be transcription errors as a result of using this technology, however minimal. Effort has been made to ensure accuracy of transcription, but any obvious errors or omissions should be clarified with the author of the document.         [1]   Patient Active Problem  List  Diagnosis    Ovarian cyst    History of multiple miscarriages    Marijuana use   [2]   Current Outpatient Medications   Medication Sig Dispense Refill    PNV,calcium 72-iron,carb-folic (PRENATAL PLUS) 29 mg iron- 1 mg Tab Take 1 tablet by mouth once daily. 30 tablet 11     No current facility-administered medications for this visit.

## 2025-03-03 ENCOUNTER — OFFICE VISIT (OUTPATIENT)
Dept: MATERNAL FETAL MEDICINE | Facility: CLINIC | Age: 25
End: 2025-03-03
Payer: MEDICAID

## 2025-03-03 VITALS
HEART RATE: 82 BPM | HEIGHT: 64 IN | WEIGHT: 123 LBS | BODY MASS INDEX: 21 KG/M2 | DIASTOLIC BLOOD PRESSURE: 65 MMHG | SYSTOLIC BLOOD PRESSURE: 110 MMHG

## 2025-03-03 DIAGNOSIS — N96 HISTORY OF MULTIPLE MISCARRIAGES: Primary | ICD-10-CM

## 2025-03-03 DIAGNOSIS — F12.90 MARIJUANA USE: ICD-10-CM

## 2025-03-03 PROCEDURE — 1160F RVW MEDS BY RX/DR IN RCRD: CPT | Mod: CPTII,S$GLB,, | Performed by: OBSTETRICS & GYNECOLOGY

## 2025-03-03 PROCEDURE — 1159F MED LIST DOCD IN RCRD: CPT | Mod: CPTII,S$GLB,, | Performed by: OBSTETRICS & GYNECOLOGY

## 2025-03-03 PROCEDURE — 99203 OFFICE O/P NEW LOW 30 MIN: CPT | Mod: S$GLB,,, | Performed by: OBSTETRICS & GYNECOLOGY

## 2025-03-03 PROCEDURE — 3074F SYST BP LT 130 MM HG: CPT | Mod: CPTII,S$GLB,, | Performed by: OBSTETRICS & GYNECOLOGY

## 2025-03-03 PROCEDURE — 3078F DIAST BP <80 MM HG: CPT | Mod: CPTII,S$GLB,, | Performed by: OBSTETRICS & GYNECOLOGY

## 2025-03-03 PROCEDURE — 3008F BODY MASS INDEX DOCD: CPT | Mod: CPTII,S$GLB,, | Performed by: OBSTETRICS & GYNECOLOGY

## 2025-04-02 ENCOUNTER — TELEPHONE (OUTPATIENT)
Dept: MATERNAL FETAL MEDICINE | Facility: CLINIC | Age: 25
End: 2025-04-02
Payer: MEDICAID

## (undated) DEVICE — DRAPE MEDI-SLUSH 44X44IN

## (undated) DEVICE — SUPPORT ULNA NERVE PROTECTOR

## (undated) DEVICE — GLOVE SIGNATURE ESSNTL LTX 8

## (undated) DEVICE — TROCAR ENDOPATH XCELL 150X12MM

## (undated) DEVICE — SUT VICRYL+ 27 UR-6 VIOL

## (undated) DEVICE — NDL SYR 10ML 18X1.5 LL BLUNT

## (undated) DEVICE — TROCAR ENDOPATH XCEL 5X100MM

## (undated) DEVICE — JELLY SURGILUBE LUBE TUBE 2OZ

## (undated) DEVICE — SHEARS HARMONIC CRVD TIP 36CM

## (undated) DEVICE — SUT VICRYL PLUS 4-0 FS-2 27IN

## (undated) DEVICE — CANNULA ENDOPATH XCEL 5X100MM

## (undated) DEVICE — Device

## (undated) DEVICE — ELECTRODE PATIENT RETURN DISP

## (undated) DEVICE — ADHESIVE DERMABOND ADVANCED

## (undated) DEVICE — COVER PROXIMA MAYO STAND

## (undated) DEVICE — TRAY SKIN SCRUB WET PREMIUM

## (undated) DEVICE — KIT GYN LAPAROSCOPY LAFAYETTE

## (undated) DEVICE — BLADE SURG STAINLESS STEEL #11

## (undated) DEVICE — SUT VICRYL 2-0 27 CT-1

## (undated) DEVICE — NDL HYPO REG 25G X 1 1/2

## (undated) DEVICE — SYS HYDRO-SURG IRR SMOKE EVAC

## (undated) DEVICE — SOL IRRI STRL WATER 1000ML

## (undated) DEVICE — BAG SPEC RETRV ENDO 4X6IN DISP